# Patient Record
(demographics unavailable — no encounter records)

---

## 2024-10-08 NOTE — PHYSICAL EXAM
[Alert] : alert [No Acute Distress] : no acute distress [Normocephalic] : normocephalic [Anterior [place] Closed] : anterior fontanelle closed [Red Reflex Bilateral] : red reflex bilateral [PERRL] : PERRL [Normally Placed Ears] : normally placed ears [Auricles Well Formed] : auricles well formed [Clear Tympanic membranes with present light reflex and bony landmarks] : clear tympanic membranes with present light reflex and bony landmarks [No Discharge] : no discharge [Nares Patent] : nares patent [Palate Intact] : palate intact [Uvula Midline] : uvula midline [Tooth Eruption] : tooth eruption  [Supple, full passive range of motion] : supple, full passive range of motion [No Palpable Masses] : no palpable masses [Symmetric Chest Rise] : symmetric chest rise [Clear to Auscultation Bilaterally] : clear to auscultation bilaterally [Regular Rate and Rhythm] : regular rate and rhythm [S1, S2 present] : S1, S2 present [No Murmurs] : no murmurs [+2 Femoral Pulses] : +2 femoral pulses [Soft] : soft [NonTender] : non tender [Non Distended] : non distended [Normoactive Bowel Sounds] : normoactive bowel sounds [No Hepatomegaly] : no hepatomegaly [No Splenomegaly] : no splenomegaly [Central Urethral Opening] : central urethral opening [Testicles Descended Bilaterally] : testicles descended bilaterally [Patent] : patent [Normally Placed] : normally placed [No Abnormal Lymph Nodes Palpated] : no abnormal lymph nodes palpated [No Clavicular Crepitus] : no clavicular crepitus [Negative Lora-Ortalani] : negative Lora-Ortalani [Symmetric Buttocks Creases] : symmetric buttocks creases [No Spinal Dimple] : no spinal dimple [NoTuft of Hair] : no tuft of hair [Cranial Nerves Grossly Intact] : cranial nerves grossly intact [de-identified] : + rash on cheeks

## 2024-10-08 NOTE — HISTORY OF PRESENT ILLNESS
[Mother] : mother [Cow's milk (Ounces per day ___)] : consumes [unfilled] oz of cow's milk per day [Table food] : table food [Normal] : Normal [Brushing teeth] : Brushing teeth [Vitamin] : Primary Fluoride Source: Vitamin [Up to date] : Up to date [FreeTextEntry1] :  -D has resolved. Back to nl diet -no recurrence of "frozen" episode

## 2024-10-08 NOTE — PLAN
[TextEntry] : [facility] 3 mths. Disc diet, sleep, development. Follow GMS. Try aquaphor for rash

## 2024-10-08 NOTE — PLAN
[TextEntry] : [facility] 3 mths. Disc diet, sleep, development. Follow GMS. Try aquaphor for rash

## 2024-10-08 NOTE — PLAN
[TextEntry] : [facility] 3 mths. Disc diet, sleep, development. Follow GMS. Try aquaphor for rash

## 2024-10-08 NOTE — PHYSICAL EXAM
[Alert] : alert [No Acute Distress] : no acute distress [Normocephalic] : normocephalic [Anterior [place] Closed] : anterior fontanelle closed [Red Reflex Bilateral] : red reflex bilateral [PERRL] : PERRL [Normally Placed Ears] : normally placed ears [Auricles Well Formed] : auricles well formed [Clear Tympanic membranes with present light reflex and bony landmarks] : clear tympanic membranes with present light reflex and bony landmarks [No Discharge] : no discharge [Nares Patent] : nares patent [Palate Intact] : palate intact [Uvula Midline] : uvula midline [Tooth Eruption] : tooth eruption  [Supple, full passive range of motion] : supple, full passive range of motion [No Palpable Masses] : no palpable masses [Symmetric Chest Rise] : symmetric chest rise [Clear to Auscultation Bilaterally] : clear to auscultation bilaterally [Regular Rate and Rhythm] : regular rate and rhythm [S1, S2 present] : S1, S2 present [No Murmurs] : no murmurs [+2 Femoral Pulses] : +2 femoral pulses [Soft] : soft [NonTender] : non tender [Non Distended] : non distended [Normoactive Bowel Sounds] : normoactive bowel sounds [No Hepatomegaly] : no hepatomegaly [No Splenomegaly] : no splenomegaly [Central Urethral Opening] : central urethral opening [Testicles Descended Bilaterally] : testicles descended bilaterally [Patent] : patent [Normally Placed] : normally placed [No Abnormal Lymph Nodes Palpated] : no abnormal lymph nodes palpated [No Clavicular Crepitus] : no clavicular crepitus [Negative Lora-Ortalani] : negative Lora-Ortalani [Symmetric Buttocks Creases] : symmetric buttocks creases [No Spinal Dimple] : no spinal dimple [NoTuft of Hair] : no tuft of hair [Cranial Nerves Grossly Intact] : cranial nerves grossly intact [de-identified] : + rash on cheeks

## 2024-10-08 NOTE — PHYSICAL EXAM
[Alert] : alert [No Acute Distress] : no acute distress [Normocephalic] : normocephalic [Anterior [place] Closed] : anterior fontanelle closed [Red Reflex Bilateral] : red reflex bilateral [PERRL] : PERRL [Normally Placed Ears] : normally placed ears [Auricles Well Formed] : auricles well formed [Clear Tympanic membranes with present light reflex and bony landmarks] : clear tympanic membranes with present light reflex and bony landmarks [No Discharge] : no discharge [Nares Patent] : nares patent [Palate Intact] : palate intact [Uvula Midline] : uvula midline [Tooth Eruption] : tooth eruption  [Supple, full passive range of motion] : supple, full passive range of motion [No Palpable Masses] : no palpable masses [Symmetric Chest Rise] : symmetric chest rise [Clear to Auscultation Bilaterally] : clear to auscultation bilaterally [Regular Rate and Rhythm] : regular rate and rhythm [S1, S2 present] : S1, S2 present [No Murmurs] : no murmurs [+2 Femoral Pulses] : +2 femoral pulses [Soft] : soft [NonTender] : non tender [Non Distended] : non distended [Normoactive Bowel Sounds] : normoactive bowel sounds [No Hepatomegaly] : no hepatomegaly [No Splenomegaly] : no splenomegaly [Central Urethral Opening] : central urethral opening [Testicles Descended Bilaterally] : testicles descended bilaterally [Patent] : patent [Normally Placed] : normally placed [No Abnormal Lymph Nodes Palpated] : no abnormal lymph nodes palpated [No Clavicular Crepitus] : no clavicular crepitus [Negative Lora-Ortalani] : negative Lora-Ortalani [Symmetric Buttocks Creases] : symmetric buttocks creases [No Spinal Dimple] : no spinal dimple [NoTuft of Hair] : no tuft of hair [Cranial Nerves Grossly Intact] : cranial nerves grossly intact [de-identified] : + rash on cheeks

## 2024-12-03 NOTE — HISTORY OF PRESENT ILLNESS
[de-identified] : ER f/u [FreeTextEntry6] :  Pt woke last evening with croup. Was seen at [facility] ER early this morning   Given po decadron x 1. Is better. Does have rhinorrhea and looser cough. Afebrile

## 2024-12-11 NOTE — PHYSICAL EXAM
[Wheezing] : no wheezing [Rales] : no rales [Crackles] : no crackles [Transmitted Upper Airway Sounds] : no transmitted upper airway sounds [Tachypnea] : no tachypnea [Rhonchi] : no rhonchi [Belly Breathing] : no belly breathing [Subcostal Retractions] : no subcostal retractions [Suprasternal Retractions] : no suprasternal retractions [NL] : warm, clear [de-identified] : no cough assessed in exam room

## 2024-12-11 NOTE — DISCUSSION/SUMMARY
[FreeTextEntry1] : Anticipatory guidance and parent education given.  Symptoms likely due to viral URI.  May use Tylenol or Ibuprofen as needed for fever or discomfort. Recommend supportive care including increased fluids, rest, and nasal saline followed by nasal suction.  Return if symptoms worsen or persist.

## 2024-12-11 NOTE — HISTORY OF PRESENT ILLNESS
[de-identified] : fever, cough [FreeTextEntry6] : BIB mother for fever to 100.7 and wet cough x1 day. Mother reports patient was diagnosed with croup on 10/3, symptoms had fully resolved. Tylenol ~2 hrs ago.  No difficulty breathing. No v/d. No rash. No fatigue. Good po/uop/bm. Normal sleep and activity.

## 2024-12-13 NOTE — HISTORY OF PRESENT ILLNESS
[de-identified] : fever [FreeTextEntry6] : BIB mother for persistent fevers (~100.7) and wet cough since being seen in office on 12/11.  No difficulty breathing. No v/d. No rash. No fatigue. Good po/uop/bm. Normal sleep and activity.

## 2024-12-13 NOTE — PHYSICAL EXAM
[Clear] : right tympanic membrane not clear [Erythema] : no erythema [Bulging] : bulging [Purulent Effusion] : purulent effusion [Perforated] : not perforated [NL] : warm, clear [FreeTextEntry3] : R ear with cerumen impaction, aom s/p cleared with cuvette

## 2024-12-21 NOTE — HISTORY OF PRESENT ILLNESS
[FreeTextEntry6] : Pt presents with mom, brother and grandmother. Pt has been on antibiotics for 7 days, amoxicillin for AOM. Pt started getting a rash last night and woke up this morning with a rash throughout his body. Mom did not give the amoxicillin dose today.  Denies any fevers. eating and drinking as normal. Voiding and stooling as normal. Seems much better.

## 2024-12-21 NOTE — PHYSICAL EXAM
[Clear] : right tympanic membrane clear [Clear Rhinorrhea] : clear rhinorrhea [Inflamed Nasal Mucosa] : inflamed nasal mucosa [Clear to Auscultation Bilaterally] : clear to auscultation bilaterally [Transmitted Upper Airway Sounds] : transmitted upper airway sounds [NL] : warm, clear

## 2025-01-07 NOTE — PHYSICAL EXAM
[Alert] : alert [No Acute Distress] : no acute distress [Normocephalic] : normocephalic [Anterior [place] Closed] : anterior fontanelle closed [Red Reflex Bilateral] : red reflex bilateral [PERRL] : PERRL [Normally Placed Ears] : normally placed ears [Auricles Well Formed] : auricles well formed [Clear Tympanic membranes with present light reflex and bony landmarks] : clear tympanic membranes with present light reflex and bony landmarks [No Discharge] : no discharge [Nares Patent] : nares patent [Palate Intact] : palate intact [Uvula Midline] : uvula midline [Tooth Eruption] : tooth eruption  [Supple, full passive range of motion] : supple, full passive range of motion [No Palpable Masses] : no palpable masses [Symmetric Chest Rise] : symmetric chest rise [Clear to Auscultation Bilaterally] : clear to auscultation bilaterally [Regular Rate and Rhythm] : regular rate and rhythm [S1, S2 present] : S1, S2 present [No Murmurs] : no murmurs [+2 Femoral Pulses] : +2 femoral pulses [Soft] : soft [NonTender] : non tender [Non Distended] : non distended [Normoactive Bowel Sounds] : normoactive bowel sounds [No Hepatomegaly] : no hepatomegaly [No Splenomegaly] : no splenomegaly [Central Urethral Opening] : central urethral opening [Testicles Descended Bilaterally] : testicles descended bilaterally [Patent] : patent [Normally Placed] : normally placed [No Abnormal Lymph Nodes Palpated] : no abnormal lymph nodes palpated [No Clavicular Crepitus] : no clavicular crepitus [Symmetric Buttocks Creases] : symmetric buttocks creases [No Spinal Dimple] : no spinal dimple [NoTuft of Hair] : no tuft of hair [Cranial Nerves Grossly Intact] : cranial nerves grossly intact [de-identified] : lower central incisor with discoloration (c/w enamel deect) [de-identified] : + KP

## 2025-01-07 NOTE — DEVELOPMENTAL MILESTONES
[FreeTextEntry1] : walk at 16 mths. + stairs/throws/kicks. 5 wds; follows commands. + body parts. + utensils   + head banging when frustrated [Yes] : Completed.

## 2025-01-07 NOTE — HISTORY OF PRESENT ILLNESS
[Mother] : mother [Table food] : table food [Normal] : Normal [Brushing teeth] : Brushing teeth [Vitamin] : Primary Fluoride Source: Vitamin [Up to date] : Up to date [FreeTextEntry7] : Illness sx's resolved. Did have rash day 7 amoxil

## 2025-01-07 NOTE — PLAN
[TextEntry] : C 6 mths. Disc diet, sleep, development. mom to call in 3 mths if language not increased

## 2025-01-24 NOTE — HISTORY OF PRESENT ILLNESS
[de-identified] : fever [FreeTextEntry6] :  Pt with 2d h/o fever, c/c. Tm 102  Exp to ill sib and GF who has influenza

## 2025-01-24 NOTE — HISTORY OF PRESENT ILLNESS
[de-identified] : fever [FreeTextEntry6] :  Pt with 2d h/o fever, c/c. Tm 102  Exp to ill sib and GF who has influenza

## 2025-02-04 NOTE — HISTORY OF PRESENT ILLNESS
[de-identified] : fever [FreeTextEntry6] :  Pt with onset fever yesterday- T 100. + "new" croupy cough x 2d   Pt seen for acute illness 1/23- did improve except for persistent mild c/c

## 2025-02-04 NOTE — HISTORY OF PRESENT ILLNESS
[de-identified] : fever [FreeTextEntry6] :  Pt with onset fever yesterday- T 100. + "new" croupy cough x 2d   Pt seen for acute illness 1/23- did improve except for persistent mild c/c

## 2025-02-07 NOTE — HISTORY OF PRESENT ILLNESS
[de-identified] : f/u re: fever [FreeTextEntry6] :  Pt seen 2/3. Still has intermittent daily fever. Today T increased. Tm- now  Cough looser. + fussy

## 2025-07-22 NOTE — PHYSICAL EXAM
[Alert] : alert [No Acute Distress] : no acute distress [Normocephalic] : normocephalic [Anterior [place] Closed] : anterior fontanelle closed [Red Reflex Bilateral] : red reflex bilateral [PERRL] : PERRL [Normally Placed Ears] : normally placed ears [Auricles Well Formed] : auricles well formed [Clear Tympanic membranes with present light reflex and bony landmarks] : clear tympanic membranes with present light reflex and bony landmarks [No Discharge] : no discharge [Nares Patent] : nares patent [Palate Intact] : palate intact [Uvula Midline] : uvula midline [Tooth Eruption] : tooth eruption  [Supple, full passive range of motion] : supple, full passive range of motion [No Palpable Masses] : no palpable masses [Symmetric Chest Rise] : symmetric chest rise [Clear to Auscultation Bilaterally] : clear to auscultation bilaterally [Regular Rate and Rhythm] : regular rate and rhythm [S1, S2 present] : S1, S2 present [No Murmurs] : no murmurs [+2 Femoral Pulses] : +2 femoral pulses [Soft] : soft [NonTender] : non tender [Non Distended] : non distended [Normoactive Bowel Sounds] : normoactive bowel sounds [No Hepatomegaly] : no hepatomegaly [No Splenomegaly] : no splenomegaly [Central Urethral Opening] : central urethral opening [Testicles Descended Bilaterally] : testicles descended bilaterally [Patent] : patent [Normally Placed] : normally placed [No Abnormal Lymph Nodes Palpated] : no abnormal lymph nodes palpated [No Clavicular Crepitus] : no clavicular crepitus [Symmetric Buttocks Creases] : symmetric buttocks creases [No Spinal Dimple] : no spinal dimple [NoTuft of Hair] : no tuft of hair [Cranial Nerves Grossly Intact] : cranial nerves grossly intact [No Rash or Lesions] : no rash or lesions

## 2025-07-22 NOTE — DEVELOPMENTAL MILESTONES
[FreeTextEntry1] : > 20 wds; + phrases. + crayons. does not remove socks/shoes. + TT interest  Frustration sx's less [Passed] : passed

## 2025-07-22 NOTE — HISTORY OF PRESENT ILLNESS
[Mother] : mother [Table food] : table food [Normal] : Normal [Brushing teeth] : Brushing teeth [No] : Patient does not go to dentist yearly [Vitamin] : Primary Fluoride Source: Vitamin [Up to date] : Up to date [FreeTextEntry7] : Mom removed tick from scalp 3-4 weeks ago. Duration of attachment unclear but very likely < 2 days. Pt asymptomatic